# Patient Record
Sex: FEMALE | Race: WHITE | NOT HISPANIC OR LATINO | Employment: PART TIME | URBAN - METROPOLITAN AREA
[De-identification: names, ages, dates, MRNs, and addresses within clinical notes are randomized per-mention and may not be internally consistent; named-entity substitution may affect disease eponyms.]

---

## 2017-06-06 RX ORDER — TRIAMTERENE AND HYDROCHLOROTHIAZIDE 37.5; 25 MG/1; MG/1
1 CAPSULE ORAL DAILY
COMMUNITY

## 2017-06-07 ENCOUNTER — ANESTHESIA EVENT (OUTPATIENT)
Dept: SURGERY | Facility: CLINIC | Age: 82
End: 2017-06-07
Payer: MEDICARE

## 2017-06-07 ENCOUNTER — SURGERY (OUTPATIENT)
Age: 82
End: 2017-06-07

## 2017-06-07 ENCOUNTER — HOSPITAL ENCOUNTER (OUTPATIENT)
Facility: CLINIC | Age: 82
Discharge: HOME | End: 2017-06-07
Attending: OPHTHALMOLOGY | Admitting: OPHTHALMOLOGY
Payer: MEDICARE

## 2017-06-07 ENCOUNTER — ANESTHESIA (OUTPATIENT)
Dept: SURGERY | Facility: CLINIC | Age: 82
End: 2017-06-07
Payer: MEDICARE

## 2017-06-07 VITALS
WEIGHT: 121 LBS | RESPIRATION RATE: 14 BRPM | OXYGEN SATURATION: 100 % | DIASTOLIC BLOOD PRESSURE: 59 MMHG | SYSTOLIC BLOOD PRESSURE: 104 MMHG | HEIGHT: 66 IN | BODY MASS INDEX: 19.44 KG/M2 | TEMPERATURE: 98.5 F

## 2017-06-07 PROCEDURE — 25000128 H RX IP 250 OP 636: Performed by: NURSE ANESTHETIST, CERTIFIED REGISTERED

## 2017-06-07 PROCEDURE — 25000125 ZZHC RX 250: Performed by: ANESTHESIOLOGY

## 2017-06-07 PROCEDURE — 36000101 ZZH EYE SURGERY LEVEL 3 1ST 30 MIN: Performed by: OPHTHALMOLOGY

## 2017-06-07 PROCEDURE — 25000128 H RX IP 250 OP 636: Performed by: OPHTHALMOLOGY

## 2017-06-07 PROCEDURE — 71000028 ZZH EYE RECOVERY PHASE 2 EACH 15 MINS: Performed by: OPHTHALMOLOGY

## 2017-06-07 PROCEDURE — 37000008 ZZH ANESTHESIA TECHNICAL FEE, 1ST 30 MIN: Performed by: OPHTHALMOLOGY

## 2017-06-07 PROCEDURE — 25000128 H RX IP 250 OP 636: Performed by: ANESTHESIOLOGY

## 2017-06-07 PROCEDURE — 27210794 ZZH OR GENERAL SUPPLY STERILE: Performed by: OPHTHALMOLOGY

## 2017-06-07 PROCEDURE — 25000125 ZZHC RX 250: Performed by: OPHTHALMOLOGY

## 2017-06-07 PROCEDURE — 40000170 ZZH STATISTIC PRE-PROCEDURE ASSESSMENT II: Performed by: OPHTHALMOLOGY

## 2017-06-07 PROCEDURE — V2632 POST CHMBR INTRAOCULAR LENS: HCPCS | Performed by: OPHTHALMOLOGY

## 2017-06-07 DEVICE — EYE IMP IOL AMO PCL TECNIS ZCB00 20.0: Type: IMPLANTABLE DEVICE | Site: EYE | Status: FUNCTIONAL

## 2017-06-07 RX ORDER — LIDOCAINE HYDROCHLORIDE 10 MG/ML
INJECTION, SOLUTION EPIDURAL; INFILTRATION; INTRACAUDAL; PERINEURAL PRN
Status: DISCONTINUED | OUTPATIENT
Start: 2017-06-07 | End: 2017-06-07 | Stop reason: HOSPADM

## 2017-06-07 RX ORDER — BALANCED SALT SOLUTION 6.4; .75; .48; .3; 3.9; 1.7 MG/ML; MG/ML; MG/ML; MG/ML; MG/ML; MG/ML
SOLUTION OPHTHALMIC PRN
Status: DISCONTINUED | OUTPATIENT
Start: 2017-06-07 | End: 2017-06-07 | Stop reason: HOSPADM

## 2017-06-07 RX ORDER — PROPARACAINE HYDROCHLORIDE 5 MG/ML
1 SOLUTION/ DROPS OPHTHALMIC ONCE
Status: DISCONTINUED | OUTPATIENT
Start: 2017-06-07 | End: 2017-06-07 | Stop reason: HOSPADM

## 2017-06-07 RX ORDER — MOXIFLOXACIN 5 MG/ML
1 SOLUTION/ DROPS OPHTHALMIC
Status: COMPLETED | OUTPATIENT
Start: 2017-06-07 | End: 2017-06-07

## 2017-06-07 RX ORDER — TROPICAMIDE 10 MG/ML
1 SOLUTION/ DROPS OPHTHALMIC
Status: COMPLETED | OUTPATIENT
Start: 2017-06-07 | End: 2017-06-07

## 2017-06-07 RX ORDER — PROPARACAINE HYDROCHLORIDE 5 MG/ML
1 SOLUTION/ DROPS OPHTHALMIC ONCE
Status: COMPLETED | OUTPATIENT
Start: 2017-06-07 | End: 2017-06-07

## 2017-06-07 RX ORDER — ONDANSETRON 2 MG/ML
INJECTION INTRAMUSCULAR; INTRAVENOUS PRN
Status: DISCONTINUED | OUTPATIENT
Start: 2017-06-07 | End: 2017-06-07

## 2017-06-07 RX ORDER — DICLOFENAC SODIUM 1 MG/ML
1 SOLUTION/ DROPS OPHTHALMIC
Status: COMPLETED | OUTPATIENT
Start: 2017-06-07 | End: 2017-06-07

## 2017-06-07 RX ORDER — PHENYLEPHRINE HYDROCHLORIDE 25 MG/ML
1 SOLUTION/ DROPS OPHTHALMIC
Status: COMPLETED | OUTPATIENT
Start: 2017-06-07 | End: 2017-06-07

## 2017-06-07 RX ORDER — SODIUM CHLORIDE, SODIUM LACTATE, POTASSIUM CHLORIDE, CALCIUM CHLORIDE 600; 310; 30; 20 MG/100ML; MG/100ML; MG/100ML; MG/100ML
500 INJECTION, SOLUTION INTRAVENOUS CONTINUOUS
Status: DISCONTINUED | OUTPATIENT
Start: 2017-06-07 | End: 2017-06-07 | Stop reason: HOSPADM

## 2017-06-07 RX ADMIN — PHENYLEPHRINE HYDROCHLORIDE 1 DROP: 2.5 SOLUTION/ DROPS OPHTHALMIC at 08:31

## 2017-06-07 RX ADMIN — LIDOCAINE HYDROCHLORIDE 1 ML: 10 INJECTION, SOLUTION EPIDURAL; INFILTRATION; INTRACAUDAL; PERINEURAL at 08:49

## 2017-06-07 RX ADMIN — MOXIFLOXACIN HYDROCHLORIDE 1 DROP: 5 SOLUTION/ DROPS OPHTHALMIC at 08:17

## 2017-06-07 RX ADMIN — DICLOFENAC SODIUM 1 DROP: 1 SOLUTION/ DROPS OPHTHALMIC at 08:17

## 2017-06-07 RX ADMIN — EPINEPHRINE 500 ML: 1 INJECTION, SOLUTION, CONCENTRATE INTRAVENOUS at 08:48

## 2017-06-07 RX ADMIN — LIDOCAINE HYDROCHLORIDE 0.5 ML: 35 GEL OPHTHALMIC at 08:48

## 2017-06-07 RX ADMIN — ONDANSETRON 4 MG: 2 INJECTION INTRAMUSCULAR; INTRAVENOUS at 08:42

## 2017-06-07 RX ADMIN — MIDAZOLAM HYDROCHLORIDE 1 MG: 1 INJECTION, SOLUTION INTRAMUSCULAR; INTRAVENOUS at 08:42

## 2017-06-07 RX ADMIN — DICLOFENAC SODIUM 1 DROP: 1 SOLUTION/ DROPS OPHTHALMIC at 08:31

## 2017-06-07 RX ADMIN — MOXIFLOXACIN HYDROCHLORIDE 1 DROP: 5 SOLUTION/ DROPS OPHTHALMIC at 08:31

## 2017-06-07 RX ADMIN — SODIUM CHLORIDE, POTASSIUM CHLORIDE, SODIUM LACTATE AND CALCIUM CHLORIDE 500 ML: 600; 310; 30; 20 INJECTION, SOLUTION INTRAVENOUS at 08:34

## 2017-06-07 RX ADMIN — TROPICAMIDE 1 DROP: 10 SOLUTION/ DROPS OPHTHALMIC at 08:31

## 2017-06-07 RX ADMIN — PHENYLEPHRINE HYDROCHLORIDE 1 DROP: 2.5 SOLUTION/ DROPS OPHTHALMIC at 08:16

## 2017-06-07 RX ADMIN — PROPARACAINE HYDROCHLORIDE 1 DROP: 5 SOLUTION/ DROPS OPHTHALMIC at 08:16

## 2017-06-07 RX ADMIN — TROPICAMIDE 1 DROP: 10 SOLUTION/ DROPS OPHTHALMIC at 08:24

## 2017-06-07 RX ADMIN — DICLOFENAC SODIUM 1 DROP: 1 SOLUTION/ DROPS OPHTHALMIC at 08:24

## 2017-06-07 RX ADMIN — BALANCED SALT SOLUTION 15 ML: 6.4; .75; .48; .3; 3.9; 1.7 SOLUTION OPHTHALMIC at 08:48

## 2017-06-07 RX ADMIN — SODIUM CHONDROITIN SULFATE / SODIUM HYALURONATE 1 ML: 0.55-0.5 INJECTION INTRAOCULAR at 08:49

## 2017-06-07 RX ADMIN — MOXIFLOXACIN HYDROCHLORIDE 1 DROP: 5 SOLUTION/ DROPS OPHTHALMIC at 08:24

## 2017-06-07 RX ADMIN — TROPICAMIDE 1 DROP: 10 SOLUTION/ DROPS OPHTHALMIC at 08:17

## 2017-06-07 RX ADMIN — PHENYLEPHRINE HYDROCHLORIDE 1 DROP: 2.5 SOLUTION/ DROPS OPHTHALMIC at 08:24

## 2017-06-07 RX ADMIN — LIDOCAINE HYDROCHLORIDE 1 ML: 10 INJECTION, SOLUTION EPIDURAL; INFILTRATION; INTRACAUDAL; PERINEURAL at 08:32

## 2017-06-07 NOTE — IP AVS SNAPSHOT
[facility]    [address]    [place] [address]    Phone:  [phone]    Fax:  [phone]                                       After Visit Summary   6/7/2017    Nicole Gerard    MRN: [id number]           After Visit Summary Signature Page     I have received my discharge instructions, and my questions have been answered. I have discussed any challenges I see with this plan with the nurse or doctor.    ..........................................................................................................................................  Patient/Patient Representative Signature      ..........................................................................................................................................  Patient Representative Print Name and Relationship to Patient    ..................................................               ................................................  Date                                            Time    ..........................................................................................................................................  Reviewed by Signature/Title    ...................................................              ..............................................  Date                                                            Time

## 2017-06-07 NOTE — IP AVS SNAPSHOT
MRN:[id number]                      After Visit Summary   6/7/2017    Nicole Gerard    MRN: [id number]           Thank you!     Thank you for choosing [facility] for your care. Our goal is always to provide you with excellent care. Hearing back from our patients is one way we can continue to improve our services. Please take a few minutes to complete the written survey that you may receive in the mail after you visit with us. Thank you!        Patient Information     Date Of Birth          1935        About your hospital stay     You were admitted on:  June 7, 2017 You last received care in the:  [facility]    You were discharged on:  June 7, 2017       Who to Call     For medical emergencies, please call 911.  For non-urgent questions about your medical care, please call your primary care provider or clinic, [phone]  For questions related to your surgery, please call your surgery clinic        Attending Provider     Provider Specialty    Nabil Devries MD Ophthalmology       Primary Care Provider Office Phone # Fax #    Trung Bell [phone]      Further instructions from your care team       [facility] Discharge  Instructions  Anesthesia (Eye Care Center)   Adult Discharge Instructions    For 24 hours after surgery    1. Get plenty of rest.  Make arrangements to have a responsible adult stay with you for at least 6 hours after you leave the hospital.  2. Do not drive or use heavy equipment for 24 hours.    3. Do not drink alcohol for 24 hours.  4. Do not sign legal documents or make important decisions for 24 hours.  5. Avoid strenuous or risky activities. You may feel lightheaded.  If so, sit for a few minutes before standing.  Have someone help you get up.   6. Conscious sedation patients may resume a regular diet..  7. Any questions of medical nature, call your physician.        POST-OPERATIVE CARE  FOLLOWING CATARACT SURGERY    DR. ELINOR ETLLO  [facility]  [phone]    Postoperative medications: After surgery, you will use several different eye drop medications. You may have either the brand specific form or generic of each type used.     Begin your eye drops today as directed.  You should instill the drop, close the eye without blinking and keep closed for 3 minutes allowing the drop to absorb.  It is fine to instill all 3 of the drops consecutively, waiting the 3 minutes in between each one. Place the shield for sleep.  In the morning, instill 1 drop of all 3 eye drops before your post-op appointment.      Antibiotic    Ofloxacin - this generic antibiotic is to be used 4 times a day for one week, you can start using this drop after you get home, instilling 3 separate times on the day of surgery and then 4 times a day there after.     Anti-inflammatory    Ketorolac -this generic drop should be used 4 times daily until gone. You can start your drops when you get home, instilling 3 separate times on the day of surgery.     Steroid    Prednisolone - the generic form should be used 4 times daily for 3 weeks or until gone. You can start your first drop after you get home, instilling 3 separate times on the day of surgery.        Do not rub the operated eye. Wear the eye shield during sleeping hours for one week.      Light sensitivity may be noticed. Sunglasses may be worn for comfort.      Some discomfort and irritation may be noticed. Acetaminophen (Tylenol) or Ibuprofen (Advil) may be taken for discomfort.      Avoid bending over, strenuous activity or heavy lifting for one week.      Keep the operated eye dry.      You may wash your hair, bathe or shower, but keep the operated eye closed while doing so.      Use medication exactly as prescribed by your doctor.  You may restart your regular home medications.      Bring all materials and medications to the clinic on your first post-operative  "visit.      Call the doctor s office if any of the following should occur:  -  any sudden vision change  -  nausea or severe headache  -  increase in pain not controlled  -  increased amount of floaters (black spots in front of vision)         -  or signs of infection (pus, increasing redness or tenderness)            Revised 12/10/2015    Pending Results     No orders found from 2017 to 2017.            Admission Information     Date & Time Provider Department Dept. Phone    2017 Nabil Devries MD [facility] [phone]      Your Vitals Were     Blood Pressure Temperature Respirations Height Weight Pulse Oximetry    100/61 98.5  F (36.9  C) (Temporal) 14 1.664 m (5' 5.5\") 54.9 kg (121 lb) 99%    BMI (Body Mass Index)                   19.83 kg/m2           MyChart Information     "[employer]" lets you send messages to your doctor, view your test results, renew your prescriptions, schedule appointments and more. To sign up, go to [email or web address] . Click on \"Log in\" on the left side of the screen, which will take you to the Welcome page. Then click on \"Sign up Now\" on the right side of the page.     You will be asked to enter the access code listed below, as well as some personal information. Please follow the directions to create your username and password.     Your access code is: [email or web address]  Expires: 2017  9:11 AM     Your access code will  in 90 days. If you need help or a new code, please call your [place] clinic or [phone].        Care EveryWhere ID     This is your Care EveryWhere ID. This could be used by other organizations to access your [place] medical records  [id number]           Review of your medicines      UNREVIEWED medicines. Ask your doctor about these medicines        Dose / Directions    DYAZIDE 37.5-25 MG per capsule   Generic drug:  triamterene-hydrochlorothiazide        Dose:  1 capsule   Take 1 capsule by mouth daily   Refills:  0    "    ECOTRIN PO        Dose:  81 mg   Take 81 mg by mouth daily   Refills:  0       FOSAMAX PO        Refills:  0       LISINOPRIL PO        Dose:  5 mg   Take 5 mg by mouth daily   Refills:  0       multivitamin, therapeutic Tabs tablet        Dose:  1 tablet   Take 1 tablet by mouth daily Chewable   Refills:  0       PREVACID PO        Dose:  30 mg   Take 30 mg by mouth every morning (before breakfast)   Refills:  0       SIMVASTATIN PO        Dose:  20 mg   Take 20 mg by mouth At Bedtime   Refills:  0       SYNTHROID PO        Dose:  50 mcg   Take 50 mcg by mouth daily   Refills:  0                Protect others around you: Learn how to safely use, store and throw away your medicines at www.disposemymeds.org.             Medication List: This is a list of all your medications and when to take them. Check marks below indicate your daily home schedule. Keep this list as a reference.      Medications           Morning Afternoon Evening Bedtime As Needed    DYAZIDE 37.5-25 MG per capsule   Take 1 capsule by mouth daily   Generic drug:  triamterene-hydrochlorothiazide                                ECOTRIN PO   Take 81 mg by mouth daily                                FOSAMAX PO                                LISINOPRIL PO   Take 5 mg by mouth daily                                multivitamin, therapeutic Tabs tablet   Take 1 tablet by mouth daily Chewable                                PREVACID PO   Take 30 mg by mouth every morning (before breakfast)                                SIMVASTATIN PO   Take 20 mg by mouth At Bedtime                                SYNTHROID PO   Take 50 mcg by mouth daily

## 2017-06-07 NOTE — ANESTHESIA CARE TRANSFER NOTE
Patient: Nicole Gerard    Procedure(s):  RIGHT EYE PHACOEMULSIFICATION CLEAR CORNEA WITH STANDARD INTRAOCULAR LENS IMPLANT  - Wound Class: I-Clean    Diagnosis: RIGHT EYE CATARACT  Diagnosis Additional Information: No value filed.    Anesthesia Type:   MAC     Note:  Airway :Room Air  Patient transferred to:PACU  Comments: To eye recovery, vss      Vitals: (Last set prior to Anesthesia Care Transfer)    CRNA VITALS  6/7/2017 0830 - 6/7/2017 0903      6/7/2017             Resp Rate (set): 10                Electronically Signed By: NIYA Bellamy CRNA  June 7, 2017  9:03 AM

## 2017-06-07 NOTE — ANESTHESIA PREPROCEDURE EVALUATION
Anesthesia Evaluation     .             ROS/MED HX    ENT/Pulmonary:      (-) sleep apnea   Neurologic:       Cardiovascular:     (+) Dyslipidemia, hypertension----. : . . . :. .       METS/Exercise Tolerance:     Hematologic:         Musculoskeletal:         GI/Hepatic:     (+) GERD Asymptomatic on medication,       Renal/Genitourinary:         Endo:     (+) thyroid problem hypothyroidism, .      Psychiatric:         Infectious Disease:         Malignancy:         Other:                     Physical Exam  Normal systems: cardiovascular, pulmonary and dental    Airway   Mallampati: II  TM distance: >3 FB  Neck ROM: full    Dental     Cardiovascular       Pulmonary                     Anesthesia Plan      History & Physical Review  History and physical reviewed and following examination; no interval change.    ASA Status:  2 .    NPO Status:  > 8 hours    Plan for MAC   PONV prophylaxis:  Ondansetron (or other 5HT-3)       Postoperative Care  Postoperative pain management:  IV analgesics.      Consents  Anesthetic plan, risks, benefits and alternatives discussed with:  Patient..                          .

## 2017-06-07 NOTE — ANESTHESIA POSTPROCEDURE EVALUATION
Patient: Nicole Gerard    Procedure(s):  RIGHT EYE PHACOEMULSIFICATION CLEAR CORNEA WITH STANDARD INTRAOCULAR LENS IMPLANT  - Wound Class: I-Clean    Diagnosis:RIGHT EYE CATARACT  Diagnosis Additional Information: No value filed.    Anesthesia Type:  MAC    Note:  Anesthesia Post Evaluation    Patient location during evaluation: PACU  Patient participation: Able to fully participate in evaluation  Level of consciousness: awake  Pain management: adequate  Airway patency: patent  Cardiovascular status: acceptable  Respiratory status: acceptable  Hydration status: acceptable  PONV: none     Anesthetic complications: None          Last vitals:  Vitals:    06/07/17 0820 06/07/17 0901 06/07/17 0914   BP: 131/61 100/61 104/59   Resp: 16 14 14   Temp: 36.9  C (98.5  F)     SpO2: 99% 99% 100%         Electronically Signed By: VONDA SCOTT MD  June 7, 2017  10:41 AM

## 2017-06-07 NOTE — OP NOTE
[facility]  Ophthalmology Operative Note    PREOPERATIVE DIAGNOSIS:  Dense cataract of the Right eye.       POSTOPERATIVE DIAGNOSIS:  Dense cataract of the Right eye.       OPERATION:  Clear corneal phacoemulsification with intraocular lens implant of the Right eye.       PROCEDURE:  Nicole Gerard was brought into the operating room with a topical anesthetic.  Under the microscope after a sterile ophthalmic prep, a lid speculum was put into place.  Anterior chamber was entered with a #75 blade.  Anterior chamber was then filled with lidocaine solution and a viscoelastic material.  A keratome blade was then used to fashion a 2.6 mm temporal incision in the corneoscleral junction and anterior capsule of the lens was opened using a circular capsulorrhexis.  The lens was carefully hydrodissected.  The phacoemulsification tip was then introduced into the eye and a central groove fashioned.  The nucleus was split in half and then quartered, and nuclear material was aspirated.  The irrigation-aspiration apparatus was then utilized to clear the remaining cortical material.  The posterior capsule was polished and a foldable posterior chamber intraocular lens was then introduced into the capsular bag, unfolded, and rotated into the horizontal position.  No sutures were necessary to close the incision site.  The viscoelastic material was aspirated.  Drops of Moxeza and Prolensa were used on the surface of the eye and a clear shield was put over the eye before the patient was dismissed from the operating room.  The patient tolerated the procedure without difficulty.       Implant Name Type Inv. Item Serial No.  Lot No. LRB No. Used   EYE IMP IOL ALEX PCL TECNIS ZCB00 20.0 Lens/Eye Implant EYE IMP IOL ALEX PCL TECNIS ZCB00 20.0 [id number] [employer]   Right 1           ELINOR TELLO MD

## 2017-06-07 NOTE — DISCHARGE INSTRUCTIONS
[facility]esthesia [facility] Discharge  Instructions  Anesthesia (Eye Care Center)   Adult Discharge Instructions    For 24 hours after surgery    1. Get plenty of rest.  Make arrangements to have a responsible adult stay with you for at least 6 hours after you leave the hospital.  2. Do not drive or use heavy equipment for 24 hours.    3. Do not drink alcohol for 24 hours.  4. Do not sign legal documents or make important decisions for 24 hours.  5. Avoid strenuous or risky activities. You may feel lightheaded.  If so, sit for a few minutes before standing.  Have someone help you get up.   6. Conscious sedation patients may resume a regular diet..  7. Any questions of medical nature, call your physician.        POST-OPERATIVE CARE FOLLOWING CATARACT SURGERY    DR. ELINOR TELLO  [facility]  [phone]    Postoperative medications: After surgery, you will use several different eye drop medications. You may have either the brand specific form or generic of each type used.     Begin your eye drops today as directed.  You should instill the drop, close the eye without blinking and keep closed for 3 minutes allowing the drop to absorb.  It is fine to instill all 3 of the drops consecutively, waiting the 3 minutes in between each one. Place the shield for sleep.  In the morning, instill 1 drop of all 3 eye drops before your post-op appointment.      Antibiotic    Ofloxacin - this generic antibiotic is to be used 4 times a day for one week, you can start using this drop after you get home, instilling 3 separate times on the day of surgery and then 4 times a day there after.     Anti-inflammatory    Ketorolac -this generic drop should be used 4 times daily until gone. You can start your drops when you get home, instilling 3 separate times on the day of surgery.     Steroid    Prednisolone - the generic form should be used 4 times daily for 3 weeks or until gone. You can start your first drop  after you get home, instilling 3 separate times on the day of surgery.        Do not rub the operated eye. Wear the eye shield during sleeping hours for one week.      Light sensitivity may be noticed. Sunglasses may be worn for comfort.      Some discomfort and irritation may be noticed. Acetaminophen (Tylenol) or Ibuprofen (Advil) may be taken for discomfort.      Avoid bending over, strenuous activity or heavy lifting for one week.      Keep the operated eye dry.      You may wash your hair, bathe or shower, but keep the operated eye closed while doing so.      Use medication exactly as prescribed by your doctor.  You may restart your regular home medications.      Bring all materials and medications to the clinic on your first post-operative visit.      Call the doctor s office if any of the following should occur:  -  any sudden vision change  -  nausea or severe headache  -  increase in pain not controlled  -  increased amount of floaters (black spots in front of vision)         -  or signs of infection (pus, increasing redness or tenderness)            Revised 12/10/2015

## 2020-03-06 ENCOUNTER — TELEPHONE (OUTPATIENT)
Dept: OTHER | Facility: CLINIC | Age: 85
End: 2020-03-06

## 2020-03-06 NOTE — TELEPHONE ENCOUNTER
Upon review, pt was scheduled incorrectly.  Appears referral was not sent through RN vascular triage pool.    Per encounter review, pt was referred for bilateral lower varicose veins.  Routing to scheduling to contact pt with update.    KRISTINA AldanaN, RN-[facility] [place]

## 2020-03-06 NOTE — TELEPHONE ENCOUNTER
Nicole was called and a voicemail was left notifying her per JA that this appointment needs to be at [facility] and not at the [facility].     [facility] number was left on Norma phone for her to call and reschedule with Dr. Miller there.    Lyla KRAUS

## 2020-06-05 ENCOUNTER — TELEPHONE (OUTPATIENT)
Dept: VASCULAR SURGERY | Facility: CLINIC | Age: 85
End: 2020-06-05

## 2020-06-05 NOTE — TELEPHONE ENCOUNTER
Called pt and LDVM regarding rescheduling her appt from April with Dr. Miller. Informed pt our [place] office will be back open next week (June 8th).     Gave direct number to call back: [phone].     Trinidad Patino, BSN, RN  [facility]  [facility]

## 2020-06-05 NOTE — TELEPHONE ENCOUNTER
Pt called back and rescheduled her appt with Dr. Miller. (see appt desk).    LAURYN Cano, RN  [facility]  [facility]

## 2020-06-30 ENCOUNTER — OFFICE VISIT (OUTPATIENT)
Dept: VASCULAR SURGERY | Facility: CLINIC | Age: 85
End: 2020-06-30
Payer: MEDICARE

## 2020-06-30 DIAGNOSIS — I83.93 SPIDER VEINS OF BOTH LOWER EXTREMITIES: Primary | ICD-10-CM

## 2020-06-30 PROCEDURE — 99207 ZZC VEINSOLUTIONS FREE SCREENING: CPT | Performed by: SURGERY

## 2020-06-30 NOTE — LETTER
6/30/2020         RE: Nicole Gerard  [address]  [place] [address]        Dear Colleague,    Thank you for referring your patient, Nicole Gerard, to the [facility]. Please see a copy of my visit note below.    Nicole Gerard is a healthy, active 85-year-old female who presents at this time for evaluation of bilateral lower extremity spider veins.  She has a prominent cluster of spider veins overlying the proximal right pretibial region.  This area is intermittently tender or uncomfortable to her.  She also has a cluster of spider telangiectasias near the medial aspect of both ankles.  These areas also sometimes ache.  She reports removal of a lower right leg varicosity greater than 10 years ago.  It sounds like it was likely stab avulsion phlebectomies.  She does not recall where she had that done.  She has no other history of venous intervention.  She has no history of hemorrhage.  She remains very physically active enjoying both tennis and golf.    Exam:  Fit appearing female who seems younger than her stated age.  Prominent clusters of spider telangiectasias throughout both legs.  Notable clusters on the lateral aspects of both thighs.  Notable cluster overlying the proximal right pretibial region.  That area is slightly elevated.  There are several pretibial reticular veins and prominent spider veins feeding into this cluster.  A few scattered small varicosities but nothing that appears clinically significant.  No wounds.  No edema.    IMPRESSION:  Spider telangiectasias of bilateral lower extremities.    RECOMMENDATION:  I had a nice discussion with Nicole reviewing all the above.  We discussed sclerotherapy as a treatment for spider telangiectasias.  She understands that this would be considered a cosmetic procedure and not covered by her insurance plan.  I gave her price quotations and discussed the procedure itself as well as the anticipated post  procedure course.  Specifically she would need to limit her sun exposure and her physical activity for several days following the treatment.  She enjoys golf and tennis and would therefore like to wait until this fall to pursue sclerotherapy.  We will measure her for thigh-high support hose today and take some preprocedure photos.  She will contact us later this fall to likely schedule her first session of cosmetic sclerotherapy.    All of her questions were answered and she verbalizes full understanding to the above.    Barrett Miller MD      Again, thank you for allowing me to participate in the care of your patient.        Sincerely,        Narciso Miller MD

## 2020-06-30 NOTE — PROGRESS NOTES
Nicole Gerard is a healthy, active 85-year-old female who presents at this time for evaluation of bilateral lower extremity spider veins.  She has a prominent cluster of spider veins overlying the proximal right pretibial region.  This area is intermittently tender or uncomfortable to her.  She also has a cluster of spider telangiectasias near the medial aspect of both ankles.  These areas also sometimes ache.  She reports removal of a lower right leg varicosity greater than 10 years ago.  It sounds like it was likely stab avulsion phlebectomies.  She does not recall where she had that done.  She has no other history of venous intervention.  She has no history of hemorrhage.  She remains very physically active enjoying both tennis and golf.    Exam:  Fit appearing female who seems younger than her stated age.  Prominent clusters of spider telangiectasias throughout both legs.  Notable clusters on the lateral aspects of both thighs.  Notable cluster overlying the proximal right pretibial region.  That area is slightly elevated.  There are several pretibial reticular veins and prominent spider veins feeding into this cluster.  A few scattered small varicosities but nothing that appears clinically significant.  No wounds.  No edema.    IMPRESSION:  Spider telangiectasias of bilateral lower extremities.    RECOMMENDATION:  I had a nice discussion with Nicole reviewing all the above.  We discussed sclerotherapy as a treatment for spider telangiectasias.  She understands that this would be considered a cosmetic procedure and not covered by her insurance plan.  I gave her price quotations and discussed the procedure itself as well as the anticipated post procedure course.  Specifically she would need to limit her sun exposure and her physical activity for several days following the treatment.  She enjoys golf and tennis and would therefore like to wait until this fall to pursue sclerotherapy.  We will measure her for  thigh-high support hose today and take some preprocedure photos.  She will contact us later this fall to likely schedule her first session of cosmetic sclerotherapy.    All of her questions were answered and she verbalizes full understanding to the above.    Barrett Miller MD

## 2020-09-29 ENCOUNTER — OFFICE VISIT (OUTPATIENT)
Dept: VASCULAR SURGERY | Facility: CLINIC | Age: 85
End: 2020-09-29

## 2020-09-29 DIAGNOSIS — I83.93 SPIDER VEINS OF BOTH LOWER EXTREMITIES: Primary | ICD-10-CM

## 2020-09-29 PROCEDURE — 36468 NJX SCLRSNT SPIDER VEINS: CPT | Performed by: SURGERY

## 2020-09-29 PROCEDURE — S9999 SALES TAX: HCPCS | Performed by: SURGERY

## 2020-09-29 PROCEDURE — A6533 GC STOCKING THIGHLNGTH 18-30: HCPCS

## 2020-09-29 NOTE — PROGRESS NOTES
[facility] Procedure Note    Nicole Gerard  September 29, 2020    Nicole Gerard is a 85 year old year old female. She presents for Sclerotherapy  .    There were no vitals taken for this visit.    Flowsheet Data 9/29/2020   Side: Left       Sclerotherapy    Date/Time: 9/29/2020 12:13 PM  Performed by: Narciso Miller MD  Authorized by: Narciso Miller MD     Time out: Immediately prior to the procedure a time out was called    Type:  Cosmetic  Session:  Full  Procedure side:  Bilateral  Solution/Amount:  .5 POLIDOCANOL and 1% POLIDOCANOL  Syringes::  5 syringes of 0.5% polidocanol; 2 syringes of 1% polidocanol    After informed consent was obtained the patient was placed on the table and her bilateral lower extremities were cleansed with an alcohol solution.  I utilized the Syris polarized light.  She had multiple large clusters of spider telangiectasias on the bilateral distal lateral thighs, right pretibial region, and left medial ankle region.  I accessed multiple areas using syringes with 30-gauge needles and the foamed 0.5% polidocanol solution.  In the distal lateral thigh clusters there were large feeding reticular veins which were accessed using a 27-gauge butterfly needle system.  These were injected with the 1% foamed polidocanol solution.  There was excellent uptake throughout with no evidence of extravasation.    She tolerated the procedure without incident.  Her legs were cleansed and dried and she was helped into her thigh-high compression stockings.  Postprocedural instructions were reviewed.  Follow-up will be with me in 6 weeks.    MD Narciso Arnold MD

## 2020-09-29 NOTE — PROGRESS NOTES
Patient purchased 1 pair(s) of black, thigh high, open-toe, size 2 compression hose from the clinic today.     Informed patient all compression hose purchases are final.    Trinidad Patino RN on 9/29/2020 at 9:44 AM

## 2020-09-29 NOTE — LETTER
9/29/2020         RE: Nicole Gerard  [address]  [facility] [address]        Dear Colleague,    Thank you for referring your patient, Nicole Gerard, to the [facility]. Please see a copy of my visit note below.          [facility]    Nicole Gerard  September 29, 2020    Nicole Gerard is a 85 year old year old female. She presents for Sclerotherapy  .    There were no vitals taken for this visit.    Flowsheet Data 9/29/2020   Side: Left       Sclerotherapy    Date/Time: 9/29/2020 12:13 PM  Performed by: Narciso Miller MD  Authorized by: Narciso Miller MD     Time out: Immediately prior to the procedure a time out was called    Type:  Cosmetic  Session:  Full  Procedure side:  Bilateral  Solution/Amount:  .5 POLIDOCANOL and 1% POLIDOCANOL  Syringes::  5 syringes of 0.5% polidocanol; 2 syringes of 1% polidocanol    After informed consent was obtained the patient was placed on the table and her bilateral lower extremities were cleansed with an alcohol solution.  I utilized the Syris polarized light.  She had multiple large clusters of spider telangiectasias on the bilateral distal lateral thighs, right pretibial region, and left medial ankle region.  I accessed multiple areas using syringes with 30-gauge needles and the foamed 0.5% polidocanol solution.  In the distal lateral thigh clusters there were large feeding reticular veins which were accessed using a 27-gauge butterfly needle system.  These were injected with the 1% foamed polidocanol solution.  There was excellent uptake throughout with no evidence of extravasation.    She tolerated the procedure without incident.  Her legs were cleansed and dried and she was helped into her thigh-high compression stockings.  Postprocedural instructions were reviewed.  Follow-up will be with me in 6 weeks.    MD Narciso Arnold MD    Again, thank you for allowing me to  participate in the care of your patient.        Sincerely,        Narciso Miller MD

## 2020-11-24 ENCOUNTER — OFFICE VISIT (OUTPATIENT)
Dept: VASCULAR SURGERY | Facility: CLINIC | Age: 85
End: 2020-11-24

## 2020-11-24 DIAGNOSIS — I83.93 SPIDER VEINS OF BOTH LOWER EXTREMITIES: Primary | ICD-10-CM

## 2020-11-24 PROCEDURE — S9999 SALES TAX: HCPCS | Performed by: SURGERY

## 2020-11-24 PROCEDURE — 36468 NJX SCLRSNT SPIDER VEINS: CPT | Performed by: SURGERY

## 2020-11-24 NOTE — PROGRESS NOTES
[facility] Procedure Note    Nicole Gerard  November 24, 2020    Nicole Gerard is a 85 year old year old female. She presents for Sclerotherapy  .    There were no vitals taken for this visit.    Flowsheet Data 9/29/2020   Side: Left       Sclerotherapy    Date/Time: 11/24/2020 11:54 AM  Performed by: Narciso Miller MD  Authorized by: Narciso Miller MD     Type:  Cosmetic  Session:  Full  Procedure side:  Bilateral  Solution/Amount:  .5 POLIDOCANOL and 1% POLIDOCANOL  Syringes::  5 syringes of 0.5% polidocanol: 2 syringes of 1% polidocanol.  Evacuation of intraluminal thrombus under sterile conditions without complications.    Patient tolerance:  Patient tolerated the procedure well with no immediate complications  Wrap/Hose:  Hose    DESCRIPTION OF PROCEDURE:  After informed consent Nicole was placed on the table in a supine position.  Her legs were cleansed in standard manner using an alcohol solution with cotton balls.  I utilized the [employer] polarized light system.  She had a few areas from her prior sclerotherapy 6 weeks ago with some intraluminal thrombus.  These areas were evacuated under sterile conditions by lancing the areas with 18-gauge needles and milking out the clot.    Clusters of remnant spider telangiectasias were then injected using 5 syringes of foamed 0.5% polidocanol and 30-gauge needles..  There was excellent uptake with no evidence of extravasation.  She also had a few areas of reticular veins feeding these clusters of spider telangiectasias.  I accessed these areas using a 27-gauge butterfly needle and injected them with foamed 1% polidocanol.    She tolerated these procedures without incident.  Her leg was cleansed and dried and she was helped into her thigh-high compression stockings.  I will see her back again in 6 weeks for a recheck of her sclerotherapy results.    MD Narciso Arnold MD

## 2020-11-24 NOTE — LETTER
11/24/2020         RE: Nicole Gerard  [address]  [place] [address]        Dear Colleague,    Thank you for referring your patient, Nicole Gerard, to the [facility] [place]. Please see a copy of my visit note below.          [facility] Procedure Note    Nicole Gerard  November 24, 2020    Nicole Gerard is a 85 year old year old female. She presents for Sclerotherapy  .    There were no vitals taken for this visit.    Flowsheet Data 9/29/2020   Side: Left       Sclerotherapy    Date/Time: 11/24/2020 11:54 AM  Performed by: Narciso Miller MD  Authorized by: Narciso Miller MD     Type:  Cosmetic  Session:  Full  Procedure side:  Bilateral  Solution/Amount:  .5 POLIDOCANOL and 1% POLIDOCANOL  Syringes::  5 syringes of 0.5% polidocanol: 2 syringes of 1% polidocanol.  Evacuation of intraluminal thrombus under sterile conditions without complications.    Patient tolerance:  Patient tolerated the procedure well with no immediate complications  Wrap/Hose:  Hose    DESCRIPTION OF PROCEDURE:  After informed consent Nicole was placed on the table in a supine position.  Her legs were cleansed in standard manner using an alcohol solution with cotton balls.  I utilized the [employer] polarized light system.  She had a few areas from her prior sclerotherapy 6 weeks ago with some intraluminal thrombus.  These areas were evacuated under sterile conditions by lancing the areas with 18-gauge needles and milking out the clot.    Clusters of remnant spider telangiectasias were then injected using 5 syringes of foamed 0.5% polidocanol and 30-gauge needles..  There was excellent uptake with no evidence of extravasation.  She also had a few areas of reticular veins feeding these clusters of spider telangiectasias.  I accessed these areas using a 27-gauge butterfly needle and injected them with foamed 1% polidocanol.    She tolerated these procedures without incident.  Her leg was  cleansed and dried and she was helped into her thigh-high compression stockings.  I will see her back again in 6 weeks for a recheck of her sclerotherapy results.    MD Narciso Arnold MD      Again, thank you for allowing me to participate in the care of your patient.        Sincerely,        Narciso Miller MD

## 2020-12-10 ENCOUNTER — TELEPHONE (OUTPATIENT)
Dept: OTHER | Facility: CLINIC | Age: 85
End: 2020-12-10

## 2020-12-10 NOTE — TELEPHONE ENCOUNTER
Dr Miller did a vein inj on 11-24 and has her 6 week f/u scheduled in Jan but she is thinking she may now be forming blood clot(s).  This happened to her after another sclero in the past and Dr Miller stated if it happens again to not wait until her next appt and to get in earlier.  I'm not seeing anything for his schedule until 12-29.  Would you like her to be seen before then??

## 2021-01-05 ENCOUNTER — OFFICE VISIT (OUTPATIENT)
Dept: VASCULAR SURGERY | Facility: CLINIC | Age: 86
End: 2021-01-05
Payer: MEDICARE

## 2021-01-05 DIAGNOSIS — Z98.890 STATUS POST SCLEROTHERAPY OF VARICOSE VEINS: ICD-10-CM

## 2021-01-05 DIAGNOSIS — Z86.79 STATUS POST SCLEROTHERAPY OF VARICOSE VEINS: ICD-10-CM

## 2021-01-05 DIAGNOSIS — Z09 SURGICAL FOLLOW-UP CARE: Primary | ICD-10-CM

## 2021-01-05 PROCEDURE — 99207 PR VEINSOLUTIONS POST OPERATIVE VISIT: CPT | Performed by: SURGERY

## 2021-01-05 NOTE — LETTER
1/5/2021         RE: Nicole Gerard  [address]  [place] [facility] [address]        Dear Colleague,    Thank you for referring your patient, Nicole Gerard, to the [facility] [place]. Please see a copy of my visit note below.    Nicole Gerard returns today 6 weeks status post her second session of cosmetic sclerotherapy for significant bilateral lower extremity spider and reticular veins.  She complains of several areas of intraluminal thrombus within these clusters of spider veins and reticular veins.  She tried getting into her the end of the month for evaluation but could not do so due to the limited holiday scheduling.  There is a thrombosed reticular vein coursing across her mid left pretibial region which has caused her some discomfort.  Other than this she seems to be doing well.    Exam:  Fit appearing female who seems younger than her stated age.  There is a thrombosed 2 mm reticular vein coursing across her mid left pretibial region.  She has several areas of punctate intraluminal thrombus and clusters of spider telangiectasias related to her last sclerotherapy session.  The most affected areas include her mid lateral left thigh and her mid lateral right thigh.  She does also have some moderate hyperpigmentation in these areas.  No open wounds.    I discussed things in great detail with Nicole.  Although she is 6 weeks out from her last sclerotherapy session the reticular vein overlying her mid left shin was particularly troublesome to her.  After informed consent I cleansed this area and 2 separate areas on her lateral thighs with an alcohol solution.  I made punctures over the reticular and spider vein thrombus using an 18-gauge needle and then I gently milked out the clot.  She tolerated this without incident.  Her leg was cleansed and dried and bandages were applied over the punctures.  We reviewed postprocedural instructions.  She still feels that these areas look  better than they did prior to sclerotherapy.  I thoroughly counseled her that she is potentially prone to future intraluminal thrombus formation and hyperpigmentation given what we have seen thus far.  She verbalizes full understanding to all of the above.    ASSESSMENT:  6 weeks status post second session of cosmetic injection sclerotherapy with subsequent development of moderate intraluminal thrombus and hyperpigmentation.  Status post evacuation of intraluminal thrombus here in the office today.    RECOMMENDATION:  I reviewed all the above with Nicole.  Again, I thoroughly counseled her that future sclerotherapy could lead to the development of more intraluminal thrombus and certainly the development of more hyperpigmentation.  She verbalizes full understanding to all the above.  She still feels that her legs look better now than they did prior to sclerotherapy.  She still has clusters of spider telangiectasias that she hopes to address down by her ankles.  I recommended that she wait 3 to 6 weeks to recover from the evacuation of the intraluminal thrombus.  In any event she will return in 3 to 6 weeks for a recheck of her legs and possible third session of sclerotherapy.    Barrett Miller MD      Again, thank you for allowing me to participate in the care of your patient.        Sincerely,        Narciso Miller MD

## 2021-01-06 NOTE — PROGRESS NOTES
Nicole Gerard returns today 6 weeks status post her second session of cosmetic sclerotherapy for significant bilateral lower extremity spider and reticular veins.  She complains of several areas of intraluminal thrombus within these clusters of spider veins and reticular veins.  She tried getting into her the end of the month for evaluation but could not do so due to the limited holiday scheduling.  There is a thrombosed reticular vein coursing across her mid left pretibial region which has caused her some discomfort.  Other than this she seems to be doing well.    Exam:  Fit appearing female who seems younger than her stated age.  There is a thrombosed 2 mm reticular vein coursing across her mid left pretibial region.  She has several areas of punctate intraluminal thrombus and clusters of spider telangiectasias related to her last sclerotherapy session.  The most affected areas include her mid lateral left thigh and her mid lateral right thigh.  She does also have some moderate hyperpigmentation in these areas.  No open wounds.    I discussed things in great detail with Nicole.  Although she is 6 weeks out from her last sclerotherapy session the reticular vein overlying her mid left shin was particularly troublesome to her.  After informed consent I cleansed this area and 2 separate areas on her lateral thighs with an alcohol solution.  I made punctures over the reticular and spider vein thrombus using an 18-gauge needle and then I gently milked out the clot.  She tolerated this without incident.  Her leg was cleansed and dried and bandages were applied over the punctures.  We reviewed postprocedural instructions.  She still feels that these areas look better than they did prior to sclerotherapy.  I thoroughly counseled her that she is potentially prone to future intraluminal thrombus formation and hyperpigmentation given what we have seen thus far.  She verbalizes full understanding to all of the  above.    ASSESSMENT:  6 weeks status post second session of cosmetic injection sclerotherapy with subsequent development of moderate intraluminal thrombus and hyperpigmentation.  Status post evacuation of intraluminal thrombus here in the office today.    RECOMMENDATION:  I reviewed all the above with Nicole.  Again, I thoroughly counseled her that future sclerotherapy could lead to the development of more intraluminal thrombus and certainly the development of more hyperpigmentation.  She verbalizes full understanding to all the above.  She still feels that her legs look better now than they did prior to sclerotherapy.  She still has clusters of spider telangiectasias that she hopes to address down by her ankles.  I recommended that she wait 3 to 6 weeks to recover from the evacuation of the intraluminal thrombus.  In any event she will return in 3 to 6 weeks for a recheck of her legs and possible third session of sclerotherapy.    Barrett Miller MD

## 2021-02-12 ENCOUNTER — IMMUNIZATION (OUTPATIENT)
Dept: NURSING | Facility: CLINIC | Age: 86
End: 2021-02-12
Payer: MEDICARE

## 2021-02-12 PROCEDURE — 0001A PR COVID VAC PFIZER DIL RECON 30 MCG/0.3 ML IM: CPT

## 2021-02-12 PROCEDURE — 91300 PR COVID VAC PFIZER DIL RECON 30 MCG/0.3 ML IM: CPT

## 2021-02-16 ENCOUNTER — OFFICE VISIT (OUTPATIENT)
Dept: VASCULAR SURGERY | Facility: CLINIC | Age: 86
End: 2021-02-16

## 2021-02-16 DIAGNOSIS — I83.93 SPIDER VEINS OF BOTH LOWER EXTREMITIES: Primary | ICD-10-CM

## 2021-02-16 PROCEDURE — S9999 SALES TAX: HCPCS | Performed by: SURGERY

## 2021-02-16 PROCEDURE — 36468 NJX SCLRSNT SPIDER VEINS: CPT | Performed by: SURGERY

## 2021-02-16 NOTE — PROGRESS NOTES
[facility] Procedure Note    Nicole Gerard  February 16, 2021    Nicole Gerard is a 85 year old year old female. She presents for Sclerotherapy  .    There were no vitals taken for this visit.    Flowsheet Data 9/29/2020   Side: Left       Sclerotherapy    Date/Time: 2/16/2021 10:03 AM  Performed by: Narciso Miller MD  Authorized by: Narciso Miller MD     Time out: Immediately prior to the procedure a time out was called    Preparation: Patient was prepped and draped in usual sterile fashion    Type:  Cosmetic  Session:  Full  Procedure side:  Bilateral  Solution/Amount:  .5 POLIDOCANOL  Syringes::  5 syringes of foamed 0.5% polidocanol  Patient tolerance:  Patient tolerated the procedure well with no immediate complications  Wrap/Hose:  Hose    DESCRIPTION OF PROCEDURE:  Nicole presents today for what will be her third round of cosmetic injection sclerotherapy.  After informed consent was obtained she was placed on the table in a supine position.  I cleansed her bilateral legs with an alcohol solution.  Per her request, I focused on residual spider telangiectasias below both knees.  I utilized a Syris polarized headlight.  I used 5 syringes of foamed 0.5% polidocanol.  Using 30-gauge needles I accessed multiple clusters of spider telangiectasias on both lower extremities.  There was excellent uptake with no obvious extravasation.  She tolerated the procedure without incident.  Her legs were cleansed and dried and she was helped into her thigh-high support stockings.  Post procedure instructions were again reviewed.  All of her questions were answered.  She ambulated as instructed.  Vascular surgical follow-up will be with me in 6 weeks for a post scleral recheck.      Narciso Miller MD

## 2021-02-16 NOTE — LETTER
2/16/2021         RE: Nicole Gerard  [address]  [place] [facility] [address]        Dear Colleague,    Thank you for referring your patient, Nicole Gerard, to the [facility] [place]. Please see a copy of my visit note below.          [facility] Procedure Note    Nicole Gerard  February 16, 2021    Nicole Gerard is a 85 year old year old female. She presents for Sclerotherapy  .    There were no vitals taken for this visit.    Flowsheet Data 9/29/2020   Side: Left       Sclerotherapy    Date/Time: 2/16/2021 10:03 AM  Performed by: Narciso Miller MD  Authorized by: Narciso Miller MD     Time out: Immediately prior to the procedure a time out was called    Preparation: Patient was prepped and draped in usual sterile fashion    Type:  Cosmetic  Session:  Full  Procedure side:  Bilateral  Solution/Amount:  .5 POLIDOCANOL  Syringes::  5 syringes of foamed 0.5% polidocanol  Patient tolerance:  Patient tolerated the procedure well with no immediate complications  Wrap/Hose:  Hose    DESCRIPTION OF PROCEDURE:  Nicole presents today for what will be her third round of cosmetic injection sclerotherapy.  After informed consent was obtained she was placed on the table in a supine position.  I cleansed her bilateral legs with an alcohol solution.  Per her request, I focused on residual spider telangiectasias below both knees.  I utilized a Syris polarized headlight.  I used 5 syringes of foamed 0.5% polidocanol.  Using 30-gauge needles I accessed multiple clusters of spider telangiectasias on both lower extremities.  There was excellent uptake with no obvious extravasation.  She tolerated the procedure without incident.  Her legs were cleansed and dried and she was helped into her thigh-high support stockings.  Post procedure instructions were again reviewed.  All of her questions were answered.  She ambulated as instructed.  Vascular surgical follow-up will be with me in 6  weeks for a post scleral recheck.      Narciso Millre MD      Again, thank you for allowing me to participate in the care of your patient.        Sincerely,        Narciso Miller MD

## 2021-02-28 ENCOUNTER — HEALTH MAINTENANCE LETTER (OUTPATIENT)
Age: 86
End: 2021-02-28

## 2021-03-05 ENCOUNTER — IMMUNIZATION (OUTPATIENT)
Dept: NURSING | Facility: CLINIC | Age: 86
End: 2021-03-05
Attending: INTERNAL MEDICINE
Payer: MEDICARE

## 2021-03-05 PROCEDURE — 91300 PR COVID VAC PFIZER DIL RECON 30 MCG/0.3 ML IM: CPT

## 2021-03-05 PROCEDURE — 0002A PR COVID VAC PFIZER DIL RECON 30 MCG/0.3 ML IM: CPT

## 2021-03-30 ENCOUNTER — OFFICE VISIT (OUTPATIENT)
Dept: VASCULAR SURGERY | Facility: CLINIC | Age: 86
End: 2021-03-30

## 2021-03-30 DIAGNOSIS — I83.93 SPIDER VEINS OF BOTH LOWER EXTREMITIES: Primary | ICD-10-CM

## 2021-03-30 PROCEDURE — 36468 NJX SCLRSNT SPIDER VEINS: CPT | Performed by: SURGERY

## 2021-03-30 PROCEDURE — S9999 SALES TAX: HCPCS | Performed by: SURGERY

## 2021-03-30 NOTE — PROGRESS NOTES
[facility] Procedure Note    Nicole Gerard  March 30, 2021    Nicole Gerard is a 85 year old year old female. She presents for Sclerotherapy  .    There were no vitals taken for this visit.    Flowsheet Data 9/29/2020   Side: Left       Sclerotherapy    Date/Time: 3/30/2021 12:21 PM  Performed by: Narciso Miller MD  Authorized by: Narciso Miller MD     Time out: Immediately prior to the procedure a time out was called    Preparation: Patient was prepped and draped in usual sterile fashion    Type:  Cosmetic  Session:  Full  Procedure side:  Bilateral  Solution/Amount:  .5 POLIDOCANOL and 1% POLIDOCANOL  Syringes::  4 syringes of 0.5% polidocanol; 1 syringe of 1% polidocanol.  Patient tolerance:  Patient tolerated the procedure well with no immediate complications  Wrap/Hose:  Keven Rivera presents today for her fourth session of cosmetic injection sclerotherapy of her bilateral lower extremities.     DESCRIPTION OF PROCEDURE:  After informed consent was obtained the patient was placed on the table and her bilateral lower extremities were cleansed with an alcohol solution.  I utilized a [employer] polarized light system.  I used 4 syringes of foamed 0.5% polidocanol.  I accessed scattered spider telangiectasias throughout both lower extremities using the syringes with 30-gauge needles.  She had excellent uptake of the polidocanol with no obvious extravasation.  She had 1 prominent reticular vein that I accessed using a 27-gauge butterfly needle and 1 syringe of foamed 1% polidocanol solution.  She tolerated the procedure without incident.  Her legs were cleansed and dried and she was helped into her thigh-high compression stockings.  We reviewed discharge post procedure instructions.  Follow-up will be with me in approximately 6 weeks for a sclerotherapy recheck.    MD Narciso Arnold MD

## 2021-03-30 NOTE — LETTER
3/30/2021         RE: Nicole Gerard  [address]  [place] [facility] [address]        Dear Colleague,    Thank you for referring your patient, Nicole Gerard, to the [facility] [place]. Please see a copy of my visit note below.          [facility] Procedure Note    Nicole Gerard  March 30, 2021    Nicole Gerard is a 85 year old year old female. She presents for Sclerotherapy  .    There were no vitals taken for this visit.    Flowsheet Data 9/29/2020   Side: Left       Sclerotherapy    Date/Time: 3/30/2021 12:21 PM  Performed by: Narciso Miller MD  Authorized by: Narciso Miller MD     Time out: Immediately prior to the procedure a time out was called    Preparation: Patient was prepped and draped in usual sterile fashion    Type:  Cosmetic  Session:  Full  Procedure side:  Bilateral  Solution/Amount:  .5 POLIDOCANOL and 1% POLIDOCANOL  Syringes::  4 syringes of 0.5% polidocanol; 1 syringe of 1% polidocanol.  Patient tolerance:  Patient tolerated the procedure well with no immediate complications  Wrap/Hose:  Keven Rivera presents today for her fourth session of cosmetic injection sclerotherapy of her bilateral lower extremities.     DESCRIPTION OF PROCEDURE:  After informed consent was obtained the patient was placed on the table and her bilateral lower extremities were cleansed with an alcohol solution.  I utilized a [employer]ris polarized light system.  I used 4 syringes of foamed 0.5% polidocanol.  I accessed scattered spider telangiectasias throughout both lower extremities using the syringes with 30-gauge needles.  She had excellent uptake of the polidocanol with no obvious extravasation.  She had 1 prominent reticular vein that I accessed using a 27-gauge butterfly needle and 1 syringe of foamed 1% polidocanol solution.  She tolerated the procedure without incident.  Her legs were cleansed and dried and she was helped into her thigh-high compression stockings.  We  reviewed discharge post procedure instructions.  Follow-up will be with me in approximately 6 weeks for a sclerotherapy recheck.    MD Narciso Arnold MD      Again, thank you for allowing me to participate in the care of your patient.        Sincerely,        Narciso Miller MD

## 2021-05-04 ENCOUNTER — OFFICE VISIT (OUTPATIENT)
Dept: VASCULAR SURGERY | Facility: CLINIC | Age: 86
End: 2021-05-04
Payer: MEDICARE

## 2021-05-04 DIAGNOSIS — Z09 SURGICAL FOLLOW-UP CARE: Primary | ICD-10-CM

## 2021-05-04 DIAGNOSIS — I83.93 SPIDER VEINS OF BOTH LOWER EXTREMITIES: ICD-10-CM

## 2021-05-04 PROCEDURE — 99207 PR VEINSOLUTIONS POST OPERATIVE VISIT: CPT | Performed by: SURGERY

## 2021-05-04 NOTE — LETTER
5/4/2021         RE: Nicole Gerard  [address]  [place] [address]        Dear Colleague,    Thank you for referring your patient, Nicole Gerard, to the [facility] [place]. Please see a copy of my visit note below.    Nicole Gerard returns today 6 weeks status post her fourth session of cosmetic injection sclerotherapy of bilateral lower extremity spider veins.  She is very pleased with the cosmetic results.  She has a small thrombosed cluster of prominent spider telangiectasias near the medial aspect of her right knee.  This is improving.  I offered her evacuation of thrombus today but she politely declined.  Her legs look dramatically improved compared to historic photographs.    No significant hyperpigmentation.  No ulcerations.    ASSESSMENT:  Status post 4 sessions of cosmetic injection sclerotherapy of bilateral lower extremity spider telangiectasias clinically doing very well.  She is very pleased with the cosmetic improvement in her lower extremities.    RECOMMENDATION:  I reviewed all the above with Nicole.  She is interested in pursuing some additional sclerotherapy.  She is an active golfer and enjoys being outdoors during the warm summer months.  She would therefore prefer to delay additional therapy until later this fall.  Follow-up will therefore be with me on an as-needed basis for any additional cosmetic injection sclerotherapy.    Barrett Miller MD        Again, thank you for allowing me to participate in the care of your patient.        Sincerely,        Narciso Miller MD

## 2021-05-04 NOTE — PROGRESS NOTES
Nicole Gerard returns today 6 weeks status post her fourth session of cosmetic injection sclerotherapy of bilateral lower extremity spider veins.  She is very pleased with the cosmetic results.  She has a small thrombosed cluster of prominent spider telangiectasias near the medial aspect of her right knee.  This is improving.  I offered her evacuation of thrombus today but she politely declined.  Her legs look dramatically improved compared to historic photographs.    No significant hyperpigmentation.  No ulcerations.    ASSESSMENT:  Status post 4 sessions of cosmetic injection sclerotherapy of bilateral lower extremity spider telangiectasias clinically doing very well.  She is very pleased with the cosmetic improvement in her lower extremities.    RECOMMENDATION:  I reviewed all the above with Nicole.  She is interested in pursuing some additional sclerotherapy.  She is an active golfer and enjoys being outdoors during the warm summer months.  She would therefore prefer to delay additional therapy until later this fall.  Follow-up will therefore be with me on an as-needed basis for any additional cosmetic injection sclerotherapy.    Barrett Miller MD

## 2021-05-25 ENCOUNTER — RECORDS - HEALTHEAST (OUTPATIENT)
Dept: ADMINISTRATIVE | Facility: CLINIC | Age: 86
End: 2021-05-25

## 2021-05-26 ENCOUNTER — RECORDS - HEALTHEAST (OUTPATIENT)
Dept: ADMINISTRATIVE | Facility: CLINIC | Age: 86
End: 2021-05-26

## 2021-05-27 ENCOUNTER — RECORDS - HEALTHEAST (OUTPATIENT)
Dept: ADMINISTRATIVE | Facility: CLINIC | Age: 86
End: 2021-05-27

## 2021-05-28 ENCOUNTER — RECORDS - HEALTHEAST (OUTPATIENT)
Dept: ADMINISTRATIVE | Facility: CLINIC | Age: 86
End: 2021-05-28

## 2021-05-29 ENCOUNTER — RECORDS - HEALTHEAST (OUTPATIENT)
Dept: ADMINISTRATIVE | Facility: CLINIC | Age: 86
End: 2021-05-29

## 2021-05-31 ENCOUNTER — RECORDS - HEALTHEAST (OUTPATIENT)
Dept: ADMINISTRATIVE | Facility: CLINIC | Age: 86
End: 2021-05-31

## 2021-07-21 ENCOUNTER — RECORDS - HEALTHEAST (OUTPATIENT)
Dept: ADMINISTRATIVE | Facility: CLINIC | Age: 86
End: 2021-07-21

## 2021-10-03 ENCOUNTER — HEALTH MAINTENANCE LETTER (OUTPATIENT)
Age: 86
End: 2021-10-03

## 2022-03-20 ENCOUNTER — HEALTH MAINTENANCE LETTER (OUTPATIENT)
Age: 87
End: 2022-03-20

## 2022-09-10 ENCOUNTER — HEALTH MAINTENANCE LETTER (OUTPATIENT)
Age: 87
End: 2022-09-10

## 2023-04-30 ENCOUNTER — HEALTH MAINTENANCE LETTER (OUTPATIENT)
Age: 88
End: 2023-04-30

## 2024-01-31 ENCOUNTER — OFFICE VISIT (OUTPATIENT)
Dept: VASCULAR SURGERY | Facility: CLINIC | Age: 89
End: 2024-01-31
Payer: MEDICARE

## 2024-01-31 DIAGNOSIS — I78.1 ASYMPTOMATIC SPIDER VEINS OF BOTH LOWER EXTREMITIES: Primary | ICD-10-CM

## 2024-01-31 PROCEDURE — 99213 OFFICE O/P EST LOW 20 MIN: CPT | Performed by: SURGERY

## 2024-01-31 NOTE — NURSING NOTE
Patient Reported symptoms:    Right leg   Heaviness Some of the time   Achiness Some of the time   Swelling None of the time   Throbbing None of the time   Itching None of the time   Appearance Moderately noticeable   Impact on work/activities Mildly reduced

## 2024-01-31 NOTE — LETTER
1/31/2024         RE: Nicole Gerard  [address]  [facility] [facility] [address]        Dear Colleague,    Thank you for referring your patient, Nicole Gerard, to the [facility] [place]. Please see a copy of my visit note below.    Nicole Gerard is a healthy 88-year-old female who is status post 4 prior sessions of cosmetic injection sclerotherapy of her bilateral lower extremities 4 years ago.  Her last treatment was in early 2021.  She is an avid golfer.  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  More recently she noticed a small spider vein coursing across to her right knee.  She returns today thinking that this small spider vein could be the cause of her right knee discomfort.  She had no other issues or concerns.    Exam:  Pleasant female who appears much younger than her stated age.  1 mm spider vein coursing across the right knee.  Additional scattered 1 mm spider veins coursing across to her bilateral pretibial regions.  Scattered minor spider veins throughout both lower extremities.  No varicosities.  Palpable pedal pulses.    IMPRESSION:  Scattered larger spider and reticular veins throughout both lower extremities in this 88-year-old woman who has previously undergone 4 sessions of cosmetic injection sclerotherapy 4 years ago.  I have told her in no uncertain terms of the spider vein coursing across to her right knee is not the cause of her knee discomfort.  The cause of her knee discomfort is her degenerative arthritis.    PLAN:  I reviewed all the above with Nicole.  She now understands that the spider vein is not the cause of her right knee pain.  Additional sclerotherapy is not medically indicated.  She may wish to pursue this for improved cosmesis.  She plans to think about it further.  She may contact our office in the future to schedule her fifth round of cosmetic injection  sclerotherapy.    Total length of this encounter was 20 minutes with time spent reviewing records, interviewing and examining the patient, answering questions, and coordinating a treatment plan.    Barrett Miller MD      Again, thank you for allowing me to participate in the care of your patient.        Sincerely,        Narciso Miller MD

## 2024-01-31 NOTE — PROGRESS NOTES
Nicole Gerard is a healthy 88-year-old female who is status post 4 prior sessions of cosmetic injection sclerotherapy of her bilateral lower extremities 4 years ago.  Her last treatment was in early 2021.  She is an avid golfer.  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  More recently she noticed a small spider vein coursing across to her right knee.  She returns today thinking that this small spider vein could be the cause of her right knee discomfort.  She had no other issues or concerns.    Exam:  Pleasant female who appears much younger than her stated age.  1 mm spider vein coursing across the right knee.  Additional scattered 1 mm spider veins coursing across to her bilateral pretibial regions.  Scattered minor spider veins throughout both lower extremities.  No varicosities.  Palpable pedal pulses.    IMPRESSION:  Scattered larger spider and reticular veins throughout both lower extremities in this 88-year-old woman who has previously undergone 4 sessions of cosmetic injection sclerotherapy 4 years ago.  I have told her in no uncertain terms of the spider vein coursing across to her right knee is not the cause of her knee discomfort.  The cause of her knee discomfort is her degenerative arthritis.    PLAN:  I reviewed all the above with Nicole.  She now understands that the spider vein is not the cause of her right knee pain.  Additional sclerotherapy is not medically indicated.  She may wish to pursue this for improved cosmesis.  She plans to think about it further.  She may contact our office in the future to schedule her fifth round of cosmetic injection sclerotherapy.    Total length of this encounter was 20 minutes with time spent reviewing records, interviewing and examining the patient, answering questions, and coordinating a treatment plan.    Barrett Miller MD

## 2024-04-28 ENCOUNTER — HEALTH MAINTENANCE LETTER (OUTPATIENT)
Age: 89
End: 2024-04-28

## 2025-05-11 ENCOUNTER — HEALTH MAINTENANCE LETTER (OUTPATIENT)
Age: 89
End: 2025-05-11

## (undated) DEVICE — GLOVE PROTEXIS MICRO 7.0  2D73PM70

## (undated) DEVICE — EYE PACK BVI READYPAK KIT #1

## (undated) DEVICE — EYE PACK CUSTOM ANTERIOR 45DEG TIP CENTURION PPK6925-01

## (undated) DEVICE — EYE SHIELD PLASTIC

## (undated) DEVICE — EYE KNIFE SLIT XSTAR VISITEC 2.6MM 45DEG 373726

## (undated) DEVICE — GLOVE PROTEXIS MICRO 8.0  2D73PM80

## (undated) DEVICE — PACK CATARACT CUSTOM SO DALE SEY32CTFCX

## (undated) DEVICE — EYE SOL BSS 500ML

## (undated) DEVICE — LINEN TOWEL PACK X5 5464

## (undated) DEVICE — DRAPE SHEET REV FOLD 3/4 9349